# Patient Record
Sex: FEMALE | Race: WHITE | NOT HISPANIC OR LATINO | Employment: FULL TIME | URBAN - METROPOLITAN AREA
[De-identification: names, ages, dates, MRNs, and addresses within clinical notes are randomized per-mention and may not be internally consistent; named-entity substitution may affect disease eponyms.]

---

## 2018-10-18 ENCOUNTER — TELEPHONE (OUTPATIENT)
Dept: ENDOSCOPY | Facility: HOSPITAL | Age: 53
End: 2018-10-18

## 2018-11-12 ENCOUNTER — OFFICE VISIT (OUTPATIENT)
Dept: GASTROENTEROLOGY | Facility: CLINIC | Age: 53
End: 2018-11-12
Payer: COMMERCIAL

## 2018-11-12 ENCOUNTER — LAB VISIT (OUTPATIENT)
Dept: LAB | Facility: HOSPITAL | Age: 53
End: 2018-11-12
Payer: COMMERCIAL

## 2018-11-12 VITALS
SYSTOLIC BLOOD PRESSURE: 126 MMHG | WEIGHT: 234.56 LBS | HEART RATE: 57 BPM | HEIGHT: 62 IN | BODY MASS INDEX: 43.16 KG/M2 | DIASTOLIC BLOOD PRESSURE: 68 MMHG

## 2018-11-12 DIAGNOSIS — R63.4 UNINTENTIONAL WEIGHT LOSS: ICD-10-CM

## 2018-11-12 DIAGNOSIS — R10.9 ABDOMINAL PAIN, UNSPECIFIED ABDOMINAL LOCATION: ICD-10-CM

## 2018-11-12 DIAGNOSIS — R10.9 ABDOMINAL PAIN, UNSPECIFIED ABDOMINAL LOCATION: Primary | ICD-10-CM

## 2018-11-12 LAB
ALBUMIN SERPL BCP-MCNC: 4.2 G/DL
ALP SERPL-CCNC: 142 U/L
ALT SERPL W/O P-5'-P-CCNC: 21 U/L
ANION GAP SERPL CALC-SCNC: 10 MMOL/L
AST SERPL-CCNC: 23 U/L
BASOPHILS # BLD AUTO: 0.03 K/UL
BASOPHILS NFR BLD: 0.5 %
BILIRUB SERPL-MCNC: 0.5 MG/DL
BUN SERPL-MCNC: 10 MG/DL
CALCIUM SERPL-MCNC: 9.8 MG/DL
CHLORIDE SERPL-SCNC: 101 MMOL/L
CO2 SERPL-SCNC: 27 MMOL/L
CREAT SERPL-MCNC: 1 MG/DL
DIFFERENTIAL METHOD: NORMAL
EOSINOPHIL # BLD AUTO: 0.2 K/UL
EOSINOPHIL NFR BLD: 3.4 %
ERYTHROCYTE [DISTWIDTH] IN BLOOD BY AUTOMATED COUNT: 13.3 %
EST. GFR  (AFRICAN AMERICAN): >60 ML/MIN/1.73 M^2
EST. GFR  (NON AFRICAN AMERICAN): >60 ML/MIN/1.73 M^2
GLUCOSE SERPL-MCNC: 276 MG/DL
HCT VFR BLD AUTO: 44.3 %
HGB BLD-MCNC: 14.4 G/DL
IMM GRANULOCYTES # BLD AUTO: 0.02 K/UL
IMM GRANULOCYTES NFR BLD AUTO: 0.3 %
LIPASE SERPL-CCNC: 27 U/L
LYMPHOCYTES # BLD AUTO: 1.6 K/UL
LYMPHOCYTES NFR BLD: 26 %
MCH RBC QN AUTO: 28.5 PG
MCHC RBC AUTO-ENTMCNC: 32.5 G/DL
MCV RBC AUTO: 88 FL
MONOCYTES # BLD AUTO: 0.3 K/UL
MONOCYTES NFR BLD: 5.2 %
NEUTROPHILS # BLD AUTO: 3.9 K/UL
NEUTROPHILS NFR BLD: 64.6 %
NRBC BLD-RTO: 0 /100 WBC
PLATELET # BLD AUTO: 180 K/UL
PMV BLD AUTO: 10.4 FL
POTASSIUM SERPL-SCNC: 3.9 MMOL/L
PROT SERPL-MCNC: 7.6 G/DL
RBC # BLD AUTO: 5.05 M/UL
SODIUM SERPL-SCNC: 138 MMOL/L
WBC # BLD AUTO: 5.97 K/UL

## 2018-11-12 PROCEDURE — 99999 PR PBB SHADOW E&M-EST. PATIENT-LVL IV: CPT | Mod: PBBFAC,,, | Performed by: STUDENT IN AN ORGANIZED HEALTH CARE EDUCATION/TRAINING PROGRAM

## 2018-11-12 PROCEDURE — 99204 OFFICE O/P NEW MOD 45 MIN: CPT | Mod: S$GLB,,, | Performed by: STUDENT IN AN ORGANIZED HEALTH CARE EDUCATION/TRAINING PROGRAM

## 2018-11-12 PROCEDURE — 85025 COMPLETE CBC W/AUTO DIFF WBC: CPT

## 2018-11-12 PROCEDURE — 83516 IMMUNOASSAY NONANTIBODY: CPT | Mod: 59

## 2018-11-12 PROCEDURE — 36415 COLL VENOUS BLD VENIPUNCTURE: CPT

## 2018-11-12 PROCEDURE — 3008F BODY MASS INDEX DOCD: CPT | Mod: CPTII,S$GLB,, | Performed by: STUDENT IN AN ORGANIZED HEALTH CARE EDUCATION/TRAINING PROGRAM

## 2018-11-12 PROCEDURE — 80053 COMPREHEN METABOLIC PANEL: CPT

## 2018-11-12 PROCEDURE — 83690 ASSAY OF LIPASE: CPT

## 2018-11-12 RX ORDER — TRAZODONE HYDROCHLORIDE 100 MG/1
200 TABLET ORAL NIGHTLY
Refills: 5 | COMMUNITY
Start: 2018-10-22

## 2018-11-12 RX ORDER — BACLOFEN 10 MG/1
5 TABLET ORAL 2 TIMES DAILY
Refills: 5 | COMMUNITY
Start: 2018-10-29

## 2018-11-12 RX ORDER — EMPAGLIFLOZIN AND LINAGLIPTIN 25; 5 MG/1; MG/1
1 TABLET, FILM COATED ORAL DAILY
Refills: 1 | COMMUNITY
Start: 2018-08-13

## 2018-11-12 RX ORDER — BUSPIRONE HYDROCHLORIDE 5 MG/1
5 TABLET ORAL 2 TIMES DAILY
Refills: 5 | COMMUNITY
Start: 2018-10-23

## 2018-11-12 RX ORDER — INSULIN ASPART 100 [IU]/ML
INJECTION, SOLUTION INTRAVENOUS; SUBCUTANEOUS
COMMUNITY

## 2018-11-12 RX ORDER — ESZOPICLONE 3 MG/1
3 TABLET, FILM COATED ORAL NIGHTLY
Refills: 5 | COMMUNITY
Start: 2018-10-31

## 2018-11-12 RX ORDER — INSULIN DEGLUDEC 100 U/ML
INJECTION, SOLUTION SUBCUTANEOUS
COMMUNITY

## 2018-11-12 RX ORDER — HYDROCODONE BITARTRATE AND ACETAMINOPHEN 10; 325 MG/1; MG/1
1 TABLET ORAL 3 TIMES DAILY PRN
Refills: 0 | COMMUNITY
Start: 2018-10-18

## 2018-11-12 RX ORDER — GABAPENTIN 300 MG/1
300 CAPSULE ORAL 2 TIMES DAILY
Refills: 5 | COMMUNITY
Start: 2018-11-05

## 2018-11-12 RX ORDER — ROSUVASTATIN CALCIUM 20 MG/1
TABLET, COATED ORAL
Refills: 11 | COMMUNITY
Start: 2018-10-08

## 2018-11-12 RX ORDER — HYDROXYZINE PAMOATE 50 MG/1
CAPSULE ORAL
Refills: 2 | COMMUNITY
Start: 2018-10-22

## 2018-11-12 RX ORDER — BUTORPHANOL TARTRATE 10 MG/ML
SPRAY NASAL
Refills: 2 | COMMUNITY
Start: 2018-11-07

## 2018-11-12 RX ORDER — CHLORDIAZEPOXIDE HYDROCHLORIDE AND CLIDINIUM BROMIDE 5; 2.5 MG/1; MG/1
1 CAPSULE ORAL 3 TIMES DAILY
COMMUNITY

## 2018-11-12 NOTE — PROGRESS NOTES
"   [facility]    Reason for visit: The primary encounter diagnosis was Abdominal pain, unspecified abdominal location. A diagnosis of Unintentional weight loss was also pertinent to this visit.  Referring Provider/PCP: To Obtain Unable    History of Present Illness:  Geovanna Kruse is a 53 y.o. female with a history of DM, chronic pain, history of CCK and appendectomy who is presenting for initial evaluation of epigastric abdominal pain.    The patient states that she was doing well with no abdominal complaints until mid June. She states that around that time she had watery diarrhea for 2 weeks that was not concerning for her and thought was possibly due to viral infection. On 7/3 she woke up with severe epigastric abdominal pain that radiated to the back with nausea and vomiting. She went to the hospital in [place]. A CT abdomen was obtained (report available in Media section), and there were no signs of pancreatitis, and no biliary dilation or choledocholithiasis. Her lipase was elevated, although we do not have labs from then. She was admitted for 4 days for management of pancreatitis, and discharged on 7/7. She states that since then, although her diarrhea resolved completely, she is still having epigastric pain radiating to her back along with nausea and decreased appetite. Her symptoms were severe enough that she was missing ~3-4 days of work every week. She denies RUQ pain. Denies change in bowel habits, melena, or hematemesis. She lost ~25lbs since then (today 235lbs). She denies fever or chills. Denies chest pain or shortness of breath.     The patient reports being seen by a local gastroenterologist. She reports she had a repeat CT scan on 10/4, but was told that it was normal (not available in records). She also had an EGD, that she was told showed "blisters" in the stomach. She was treated for H.pylori, but eradication was not confirmed. She states that she had a swallow " study (unclear if swallow study or gastric emptying study) and was told was normal as well. She was give baclofen and chlordiazepoxide by her GI doctor that she has been taking since mid September, and referral was made to us.    Of note the patient states that although her pain is persistent, her symptoms did improve and became tolerable over the past 2 weeks. She reports her appetite is still low though. She can finish half her meals before getting nausea and recurrence of abdominal pain.     The patient denies history of abdominal pain similar to this in the past. Denies history of pancreatitis. States that she had her GB removed many years ago for biliary pain, but never had choledocholithiasis. She drinks alcohol socially, but her last drink was >2 weeks prior to episode in July. She does not smoke. States that her brother was diagnosed with metastatic pancreatic cancer at the age of 54, but he was a heavy smoker.     Review of Systems:   Constitutional: no fever, chills. +change in weight   Eyes: no visual changes   ENT: no sore throat or dysphagia  Respiratory: no cough or shortness of breath   Cardiovascular: no chest pain or palpitations   Gastrointestinal: as per HPI  Hematologic/Lymphatic: no easy bruising or lymphadenopathy   Musculoskeletal: no arthralgias or myalgias   Neurological: no change in mental status  Behavioral/Psych: no change in mood    Medical History:  Past Medical History:   Diagnosis Date    Cancer     Diabetes mellitus     Ovarian cancer        Past Surgical History:   Procedure Laterality Date    APPENDECTOMY      CHOLECYSTECTOMY      HYSTERECTOMY      TONSILLECTOMY         History reviewed. No pertinent family history.    Social History     Socioeconomic History    Marital status:      Spouse name: Not on file    Number of children: Not on file    Years of education: Not on file    Highest education level: Not on file   Social Needs    Financial resource strain:  Not on file    Food insecurity - worry: Not on file    Food insecurity - inability: Not on file    Transportation needs - medical: Not on file    Transportation needs - non-medical: Not on file   Occupational History    Not on file   Tobacco Use    Smoking status: Never Smoker    Smokeless tobacco: Never Used   Substance and Sexual Activity    Alcohol use: Not on file    Drug use: Not on file    Sexual activity: Not on file   Other Topics Concern    Not on file   Social History Narrative    Not on file       Current Outpatient Medications on File Prior to Visit   Medication Sig Dispense Refill    baclofen (LIORESAL) 10 MG tablet Take 5 mg by mouth 2 (two) times daily.  5    busPIRone (BUSPAR) 5 MG Tab Take 5 mg by mouth 2 (two) times daily.  5    butorphanol (STADOL) 10 mg/mL nasal spray ADMINISTER 1 SPRAY TO 1 NOSTRIL EVERY 4 HOURS AS NEEDED FOR HEADACHE.*MUST LAST 2 WEEKS  2    chlordiazepoxide-clidinium 5-2.5 mg (LIBRAX) 5-2.5 mg Cap Take 1 capsule by mouth 3 (three) times daily.      eszopiclone (LUNESTA) 3 mg Tab Take 3 mg by mouth every evening.  5    gabapentin (NEURONTIN) 300 MG capsule Take 300 mg by mouth 2 (two) times daily.  5    GLYXAMBI 25-5 mg Tab Take 1 tablet by mouth once daily.  1    HYDROcodone-acetaminophen (NORCO)  mg per tablet Take 1 tablet by mouth 3 (three) times daily as needed.  0    hydrOXYzine pamoate (VISTARIL) 50 MG Cap TAKE 1 TABLET BY MOUTH EVERY DAY AS NEEDED ANXIETY  2    insulin aspart U-100 (NOVOLOG) 100 unit/mL injection Inject into the skin 3 (three) times daily before meals.      insulin degludec (TRESIBA FLEXTOUCH U-100) 100 unit/mL (3 mL) InPn Inject into the skin.      rosuvastatin (CRESTOR) 20 MG tablet TAKE 1 TABLET ORALLY ONCE IN THE EVENING.  11    traZODone (DESYREL) 100 MG tablet Take 200 mg by mouth nightly.  5     No current facility-administered medications on file prior to visit.        Review of patient's allergies indicates:    Allergen Reactions    Influenza virus vaccines Anaphylaxis    Compazine [prochlorperazine edisylate] Hallucinations       Physical Exam:  General: Alert and Oriented x3, no distress   Vitals:    11/12/18 1602   BP: 126/68   Pulse: (!) 57     HEENT: Normocephalic, Atraumatic. No scleral icterus.  Lymph: No cervical lymphadenopathy  Resp: Good air entry bilaterally, no adventitious sounds.  Cardiac: S1 and S2 normal.  Abdomen: Normoactive bowel sounds. Non-distended. Normal tympany. Soft. Tender to palpation epigasric tenderness.. No peritoneal signs.  Extremities: No peripheral edema. Normal bilateral pedal and radial pulses.  Neurologic: No gross neurological Deficits  Psych: Calm, cooperative. Normal mood and affect.    Laboratory:  Lab Results   Component Value Date     04/19/2011    K 3.8 04/19/2011     04/19/2011    CO2 24 04/19/2011    BUN 12 04/19/2011    CREATININE 0.8 04/19/2011    CALCIUM 9.6 04/19/2011    ANIONGAP 14 04/19/2011    ESTGFRAFRICA >60 04/19/2011    EGFRNONAA >60 04/19/2011       Lab Results   Component Value Date    ALT 42 04/19/2011    AST 39 04/19/2011     (H) 04/19/2011    ALKPHOS 162 (H) 04/19/2011    BILITOT 0.3 04/19/2011       Lab Results   Component Value Date    WBC 7.13 04/19/2011    HGB 13.1 04/19/2011    HCT 40.1 04/19/2011    MCV 87.2 04/19/2011     04/19/2011       Microbiology:  No Pertinent Microbiology    Imaging:  CT abdomen from 7/3 with no pancreatitis or biliary dilation noted.    Assessment:  Geovanna Kruse is a 53 y.o. female who is presenting for initial evaluation of abdominal pain.    Unclear cause of pain at this time. Unfortunately many of the studies done in [place] are not available to us, and the patient will bring to next appt. Although description of symptoms might be consistent with complication of pancreatitis, a negative CT scan with contrast ~1 month ago would rule that out. She is a diabetic on insulin, and can have  motility issues, unclear if she had a swallow study vs gastric emptying. Possible vascular causes of abdominal pain, rachana that her pain is worst post-prandial, but unlikely to present itself suddenly as patient is describing. We will send the patient for imaging to evaluate the pancreas and possible etiologies for pain. Will refer for an EUS as well, as she has family history of pancreatic CA, and it is unclear what caused her initial episode. Will obtain basic labs as well. We will see back in clinic in 4 weeks with prior records.    Plan:  1. CBC, CMP, lipase, celiac panel  2. MRI with contrast and MRCP to evaluate abdominal/biliary pathologies.  3. EGD with EUS to evaluate for H.pylori eradication and evaluate the pancreas for any pathologies.  4. Obtain record from [facility].  5. CRC Screening: The patient states that she had a colonoscopy >5 years ago and was found to have polyps. She know that she is due, but prefer to get her colonoscopy locally. We requested prior colonoscopy records as well.  6. Follow-up in about 4 weeks (around 12/10/2018).      Orders Placed This Encounter   Procedures    MRI Abdomen W WO Contrast    MRI MRCP Without Contrast    CBC auto differential    Comprehensive metabolic panel    Lipase    Celiac Disease Panel

## 2018-11-12 NOTE — Clinical Note
Netta - Please schedule with advanced endoscopy for EUS (to evaluate for pancreatic abnormalities; family history of pancreatic CA, recent pancreatitis 5 months ago with persistent pain), along with EGD (to confirm eradication of H.pylori)

## 2018-11-13 NOTE — PROGRESS NOTES
I was present with Lori Jacobsen MD the fellow during the above evaluation, including history and exam.  I discussed the case with the fellow and agree with the findings and plan as documented in the fellow's note.

## 2018-11-14 ENCOUNTER — TELEPHONE (OUTPATIENT)
Dept: ENDOSCOPY | Facility: HOSPITAL | Age: 53
End: 2018-11-14

## 2018-11-14 LAB
GLIADIN PEPTIDE IGA SER-ACNC: 3 UNITS
GLIADIN PEPTIDE IGG SER-ACNC: 2 UNITS
IGA SERPL-MCNC: 85 MG/DL
TTG IGA SER-ACNC: 3 UNITS
TTG IGG SER-ACNC: 2 UNITS

## 2018-11-15 ENCOUNTER — TELEPHONE (OUTPATIENT)
Dept: GASTROENTEROLOGY | Facility: CLINIC | Age: 53
End: 2018-11-15

## 2018-11-15 NOTE — TELEPHONE ENCOUNTER
----- Message from Lori Jacobsen MD sent at 11/15/2018  5:05 PM CST -----  Contact: Self   Insurance declined MRI. Please inform patient we will contact her with the next step since her MRI /MRCP were declined  ----- Message -----  From: Kinga Aiken MA  Sent: 11/15/2018   3:48 PM  To: Lori Jacobsen MD        ----- Message -----  From: Eloisa Browne  Sent: 11/15/2018   3:24 PM  To: [facility] Staff    Pt is requesting a call back in regards to her upcoming MRI. Pt stated she was told the hospital is waiting on a call from the doctors office. Pt would like to know if she should have her MRI tomorrow.       Pt can be contacted at [phone]

## 2018-11-15 NOTE — TELEPHONE ENCOUNTER
----- Message from Eloisa Browne sent at 11/15/2018  3:24 PM CST -----  Contact: Self   Pt is requesting a call back in regards to her upcoming MRI. Pt stated she was told the hospital is waiting on a call from the doctors office. Pt would like to know if she should have her MRI tomorrow.       Pt can be contacted at [phone]

## 2018-11-16 ENCOUNTER — TELEPHONE (OUTPATIENT)
Dept: GASTROENTEROLOGY | Facility: CLINIC | Age: 53
End: 2018-11-16

## 2018-11-16 DIAGNOSIS — R10.9 ABDOMINAL PAIN, UNSPECIFIED ABDOMINAL LOCATION: Primary | ICD-10-CM

## 2018-11-16 NOTE — TELEPHONE ENCOUNTER
Your Upper EGD/EUS is scheduled for 11/28/2018       At [facility] which is located at [address] [place] [id number].  You will check in at the Hospital Admit Desk located on the 1st floor of the hospital. Please call the the office if you have any questions at [phone]      Upper Endoscopic Ultrasound    Endoscopic ultrasound(EUS) is a procedure used to image the digestive tract, including pancreas, lesions in esophagus and stomach.  It is used to diagnose and stage cancers of the digestive tract.  If necessary, your doctor may need to take samples during the procedure.    A responsible adult (family member or friend) must come with you and transport you home.  You are not allowed to drive, take a taxi or bus or leave the Endoscopy Center alone.  If you do not have a responsible adult with you to take you home, your exam will be cancelled.     If you have questions about the cost of your procedure, you should contact your insurance company as soon as possible.  Please bring a picture ID and your insurance card.  You will sign  treatment authorization forms at check in.  It is necessary for you to sign these forms again even if you recently signed these at the time of your clinic visit.    Please follow instructions of  if you are taking anticoagulant/blood thinning medications such as Aggrenox, aspirin, Brilinta, Effient, Eliquis, Lovenox, Plavix, Pletal, Pradaxa, Ticilid, Xarelto or Coumadin.     Take blood pressure, heart, anti-rejection and or seizure medication at 600 am the morning of the procedure.    Skip your morning dose of insulin or other oral medications for diabetes the morning of the procedure unless instructed otherwise by your doctor.      Day Before The Procedure    Eat a light evening meal and eat no solid food after 7 pm.  You may drink clear liquids including:    Water, Coffee or decaffeinated coffee (no milk or cream)  Tea, Herbal tea  Carbonated beverages (soft  drinks), regular and sugar free  Gelatin  Apple Juice, grape juice, white cranberry juice  Gatorade, Power Aid, Crystal Light, Go Aid  Lemonade and Limeade  Bouillon, clear consomme'  Snowball, popsicles  DO NOT DRINK ANY LIQUIDS CONTAINING RED DYE  DO NOT DRINK ANY LIQUIDS NOT SPECIFICALLY LISTED  DO NOT DRINK ALCOHOL  NOTHING BY MOUTH AFTER MIDNIGHT    Day of Procedure    600 am take last dose of any medications you are allowed to take with a small amount of clear liquid

## 2018-11-16 NOTE — TELEPHONE ENCOUNTER
----- Message from Lori Jacobsen MD sent at 11/16/2018 11:09 AM CST -----  Please schedule CT scan of the abdomen with IV contrast, pancreatic protocol.

## 2018-11-16 NOTE — PROGRESS NOTES
Contacted patient's insurance.    MRI and MRCP declined.  Requested a CT abdomen with contrast prior to authorizing MRI. Also MRCP declined as it is only approved for recurrent attacks of pancreatitis only.    We will obtain CT abdomen with IV contrast to evaluate for pancreatic complications.

## 2018-11-26 ENCOUNTER — TELEPHONE (OUTPATIENT)
Dept: ENDOSCOPY | Facility: HOSPITAL | Age: 53
End: 2018-11-26

## 2018-11-26 NOTE — TELEPHONE ENCOUNTER
Spoke with patient about __800___ arrival time. Clear liquids past 7pm the day before the procedure. NPO past midnight. Please take blood pressure, heart seizure medication the morning of the procedure. Hold all diabetic medication the morning of the procedure. Leave all valuable at home.  Please have a ride available the day of the procedure.

## 2018-11-28 ENCOUNTER — ANESTHESIA EVENT (OUTPATIENT)
Dept: ENDOSCOPY | Facility: HOSPITAL | Age: 53
End: 2018-11-28
Payer: COMMERCIAL

## 2018-11-28 ENCOUNTER — ANESTHESIA (OUTPATIENT)
Dept: ENDOSCOPY | Facility: HOSPITAL | Age: 53
End: 2018-11-28
Payer: COMMERCIAL

## 2018-11-28 ENCOUNTER — HOSPITAL ENCOUNTER (OUTPATIENT)
Facility: HOSPITAL | Age: 53
Discharge: HOME | End: 2018-11-28
Attending: INTERNAL MEDICINE | Admitting: INTERNAL MEDICINE
Payer: COMMERCIAL

## 2018-11-28 VITALS
RESPIRATION RATE: 14 BRPM | TEMPERATURE: 98 F | DIASTOLIC BLOOD PRESSURE: 68 MMHG | SYSTOLIC BLOOD PRESSURE: 128 MMHG | HEART RATE: 52 BPM | WEIGHT: 230 LBS | OXYGEN SATURATION: 97 % | BODY MASS INDEX: 40.75 KG/M2 | HEIGHT: 63 IN

## 2018-11-28 DIAGNOSIS — R63.4 WEIGHT LOSS: Primary | ICD-10-CM

## 2018-11-28 LAB — GLUCOSE SERPL-MCNC: 147 MG/DL (ref 70–110)

## 2018-11-28 PROCEDURE — 43259 EGD US EXAM DUODENUM/JEJUNUM: CPT | Mod: ,,, | Performed by: INTERNAL MEDICINE

## 2018-11-28 PROCEDURE — 27201012 HC FORCEPS, HOT/COLD, DISP: Performed by: INTERNAL MEDICINE

## 2018-11-28 PROCEDURE — 88305 TISSUE EXAM BY PATHOLOGIST: CPT | Performed by: PATHOLOGY

## 2018-11-28 PROCEDURE — 37000009 HC ANESTHESIA EA ADD 15 MINS: Performed by: INTERNAL MEDICINE

## 2018-11-28 PROCEDURE — 82962 GLUCOSE BLOOD TEST: CPT | Performed by: INTERNAL MEDICINE

## 2018-11-28 PROCEDURE — 43239 EGD BIOPSY SINGLE/MULTIPLE: CPT | Mod: 59,,, | Performed by: INTERNAL MEDICINE

## 2018-11-28 PROCEDURE — 63600175 PHARM REV CODE 636 W HCPCS: Performed by: NURSE ANESTHETIST, CERTIFIED REGISTERED

## 2018-11-28 PROCEDURE — 37000008 HC ANESTHESIA 1ST 15 MINUTES: Performed by: INTERNAL MEDICINE

## 2018-11-28 PROCEDURE — 25000003 PHARM REV CODE 250: Performed by: INTERNAL MEDICINE

## 2018-11-28 PROCEDURE — 88342 IMHCHEM/IMCYTCHM 1ST ANTB: CPT | Performed by: PATHOLOGY

## 2018-11-28 PROCEDURE — 88342 IMHCHEM/IMCYTCHM 1ST ANTB: CPT | Mod: 26,,, | Performed by: PATHOLOGY

## 2018-11-28 PROCEDURE — 43259 EGD US EXAM DUODENUM/JEJUNUM: CPT | Performed by: INTERNAL MEDICINE

## 2018-11-28 PROCEDURE — 88305 TISSUE EXAM BY PATHOLOGIST: CPT | Mod: 26,,, | Performed by: PATHOLOGY

## 2018-11-28 PROCEDURE — 43239 EGD BIOPSY SINGLE/MULTIPLE: CPT | Performed by: INTERNAL MEDICINE

## 2018-11-28 RX ORDER — SODIUM CHLORIDE 0.9 % (FLUSH) 0.9 %
3 SYRINGE (ML) INJECTION
Status: DISCONTINUED | OUTPATIENT
Start: 2018-11-28 | End: 2018-11-28 | Stop reason: HOSPADM

## 2018-11-28 RX ORDER — LIDOCAINE HCL/PF 100 MG/5ML
SYRINGE (ML) INTRAVENOUS
Status: DISCONTINUED | OUTPATIENT
Start: 2018-11-28 | End: 2018-11-28

## 2018-11-28 RX ORDER — PROPOFOL 10 MG/ML
VIAL (ML) INTRAVENOUS CONTINUOUS PRN
Status: DISCONTINUED | OUTPATIENT
Start: 2018-11-28 | End: 2018-11-28

## 2018-11-28 RX ORDER — PROPOFOL 10 MG/ML
VIAL (ML) INTRAVENOUS
Status: DISCONTINUED | OUTPATIENT
Start: 2018-11-28 | End: 2018-11-28

## 2018-11-28 RX ORDER — MIDAZOLAM HYDROCHLORIDE 1 MG/ML
INJECTION, SOLUTION INTRAMUSCULAR; INTRAVENOUS
Status: DISCONTINUED | OUTPATIENT
Start: 2018-11-28 | End: 2018-11-28

## 2018-11-28 RX ORDER — FENTANYL CITRATE 50 UG/ML
INJECTION, SOLUTION INTRAMUSCULAR; INTRAVENOUS
Status: DISCONTINUED | OUTPATIENT
Start: 2018-11-28 | End: 2018-11-28

## 2018-11-28 RX ORDER — SODIUM CHLORIDE 9 MG/ML
INJECTION, SOLUTION INTRAVENOUS CONTINUOUS
Status: DISCONTINUED | OUTPATIENT
Start: 2018-11-28 | End: 2018-11-28 | Stop reason: HOSPADM

## 2018-11-28 RX ADMIN — MIDAZOLAM 2 MG: 1 INJECTION INTRAMUSCULAR; INTRAVENOUS at 09:11

## 2018-11-28 RX ADMIN — PROPOFOL 50 MG: 10 INJECTION, EMULSION INTRAVENOUS at 09:11

## 2018-11-28 RX ADMIN — PROPOFOL 150 MCG/KG/MIN: 10 INJECTION, EMULSION INTRAVENOUS at 09:11

## 2018-11-28 RX ADMIN — FENTANYL CITRATE 50 MCG: 50 INJECTION, SOLUTION INTRAMUSCULAR; INTRAVENOUS at 09:11

## 2018-11-28 RX ADMIN — LIDOCAINE HYDROCHLORIDE 100 MG: 20 INJECTION, SOLUTION INTRAVENOUS at 09:11

## 2018-11-28 RX ADMIN — SODIUM CHLORIDE: 0.9 INJECTION, SOLUTION INTRAVENOUS at 08:11

## 2018-11-28 NOTE — ANESTHESIA PREPROCEDURE EVALUATION
11/28/2018  Geovanna Kruse is a 53 y.o., female having upper EUS for eval GI symptoms.    Anesthesia Evaluation    I have reviewed the Patient Summary Reports.    I have reviewed the Nursing Notes.   I have reviewed the Medications.     Review of Systems  Anesthesia Hx:  No problems with previous Anesthesia  Personal Hx of Anesthesia complications   Social:  Non-Smoker    Neurological:   Headaches    Endocrine:   Diabetes        Physical Exam  General:  Obesity    Airway/Jaw/Neck:  Airway Findings: Mouth Opening: Normal Tongue: Normal  General Airway Assessment: Adult  Mallampati: III  Improves to III with phonation.  TM Distance: Normal, at least 6 cm  Jaw/Neck Findings:  Neck ROM: Normal ROM       Chest/Lungs:  Chest/Lungs Findings: Clear to auscultation, Normal Respiratory Rate     Heart/Vascular:  Heart Findings: Rate: Normal  Rhythm: Regular Rhythm  Sounds: Normal        Mental Status:  Mental Status Findings:  Alert and Oriented         Medical History:       Past Medical History:   Diagnosis Date    Cancer      Diabetes mellitus      Ovarian cancer                 Past Surgical History:   Procedure Laterality Date    APPENDECTOMY        CHOLECYSTECTOMY        HYSTERECTOMY        TONSILLECTOMY           Anesthesia Plan  Type of Anesthesia, risks & benefits discussed:  Anesthesia Type:  MAC  Patient's Preference:   Intra-op Monitoring Plan:   Intra-op Monitoring Plan Comments:   Post Op Pain Control Plan:   Post Op Pain Control Plan Comments:   Induction:   IV  Beta Blocker:  Patient is not currently on a Beta-Blocker (No further documentation required).       Informed Consent: Patient understands risks and agrees with Anesthesia plan.  Questions answered. Anesthesia consent signed with patient.  ASA Score: 3     Day of Surgery Review of History & Physical: I have interviewed and examined  the patient. I have reviewed the patient's H&P dated:            Ready For Surgery From Anesthesia Perspective.

## 2018-11-28 NOTE — TRANSFER OF CARE
"Anesthesia Transfer of Care Note    Patient: Geovanna Kruse    Procedure(s) Performed: Procedure(s) (LRB):  EGD (ESOPHAGOGASTRODUODENOSCOPY) (N/A)  ULTRASOUND, UPPER GI TRACT, ENDOSCOPIC (N/A)    Patient location: GI    Anesthesia Type: MAC    Transport from OR: Transported from OR on room air with adequate spontaneous ventilation    Post pain: adequate analgesia    Post assessment: no apparent anesthetic complications    Post vital signs: stable    Level of consciousness: awake    Nausea/Vomiting: no nausea/vomiting    Complications: none    Transfer of care protocol was followed      Last vitals:   Visit Vitals  BP (!) 122/58 (BP Location: Left arm, Patient Position: Lying)   Pulse (!) 55   Temp 37.1 °C (98.8 °F) (Tympanic)   Resp 17   Ht 5' 2.5" (1.588 m)   Wt 104.3 kg (230 lb)   SpO2 99%   Breastfeeding? No   BMI 41.40 kg/m²     "

## 2018-11-28 NOTE — [employer] PATIENT INSTRUCTIONS
Discharge Summary/Instructions after an Endoscopic Procedure  Patient Name: Geovanna Kruse  Patient MRN: [id number]  Patient YOB: 1965 Wednesday, November 28, 2018  Alfredo Horner MD  RESTRICTIONS:  During your procedure today, you received medications for sedation.  These   medications may affect your judgment, balance and coordination.  Therefore,   for 24 hours, you have the following restrictions:   - DO NOT drive a car, operate machinery, make legal/financial decisions,   sign important papers or drink alcohol.    ACTIVITY:  Today: no heavy lifting, straining or running due to procedural   sedation/anesthesia.  The following day: return to full activity including work.  DIET:  Eat and drink normally unless instructed otherwise.     TREATMENT FOR COMMON SIDE EFFECTS:  - Mild abdominal pain, nausea, belching, bloating or excessive gas:  rest,   eat lightly and use a heating pad.  - Sore Throat: treat with throat lozenges and/or gargle with warm salt   water.  - Because air was used during the procedure, expelling large amounts of air   from your rectum or belching is normal.  - If a bowel prep was taken, you may not have a bowel movement for 1-3 days.    This is normal.  SYMPTOMS TO WATCH FOR AND REPORT TO YOUR PHYSICIAN:  1. Abdominal pain or bloating, other than gas cramps.  2. Chest pain.  3. Back pain.  4. Signs of infection such as: chills or fever occurring within 24 hours   after the procedure.  5. Rectal bleeding, which would show as bright red, maroon, or black stools.   (A tablespoon of blood from the rectum is not serious, especially if   hemorrhoids are present.)  6. Vomiting.  7. Weakness or dizziness.  GO DIRECTLY TO THE NEAREST EMERGENCY ROOM IF YOU HAVE ANY OF THE FOLLOWING:      Difficulty breathing              Chills and/or fever over 101 F   Persistent vomiting and/or vomiting blood   Severe abdominal pain   Severe chest pain   Black, tarry stools   Bleeding- more than one  tablespoon   Any other symptom or condition that you feel may need urgent attention  Your doctor recommends these additional instructions:  If any biopsies were taken, your doctors clinic will contact you in 1 to 2   weeks with any results.  - Discharge patient to home (ambulatory).   - Patient has a contact number available for emergencies.  The signs and   symptoms of potential delayed complications were discussed with the   patient.  Return to normal activities tomorrow.  Written discharge   instructions were provided to the patient.   - Resume previous diet.   - Continue present medications.   - Await path results.   - Return to GI clinic as previously scheduled.  For questions, problems or results please call your physician - Alfredo Horner MD at Work:  [phone].  EMERGENCY PHONE NUMBER: [phone],  LAB RESULTS: [phone]  IF A COMPLICATION OR EMERGENCY SITUATION ARISES AND YOU ARE UNABLE TO REACH   YOUR PHYSICIAN - GO DIRECTLY TO THE EMERGENCY ROOM.  Alfredo Horner MD  11/28/2018 9:50:09 AM  This report has been verified and signed electronically.  [employer]

## 2018-11-28 NOTE — ANESTHESIA POSTPROCEDURE EVALUATION
"Anesthesia Post Evaluation    Patient: Geovanna Kruse    Procedure(s) Performed: Procedure(s) (LRB):  EGD (ESOPHAGOGASTRODUODENOSCOPY) (N/A)  ULTRASOUND, UPPER GI TRACT, ENDOSCOPIC (N/A)    Final Anesthesia Type: MAC  Patient location during evaluation: GI PACU  Patient participation: Yes- Able to Participate  Level of consciousness: awake and alert and oriented  Post-procedure vital signs: reviewed and stable  Pain management: adequate  Airway patency: patent  PONV status at discharge: No PONV  Anesthetic complications: no      Cardiovascular status: blood pressure returned to baseline and hemodynamically stable  Respiratory status: unassisted, spontaneous ventilation and room air  Hydration status: euvolemic  Follow-up not needed.        Visit Vitals  BP (!) 122/58 (BP Location: Left arm, Patient Position: Lying)   Pulse (!) 55   Temp 37.1 °C (98.8 °F) (Tympanic)   Resp 17   Ht 5' 2.5" (1.588 m)   Wt 104.3 kg (230 lb)   SpO2 99%   Breastfeeding? No   BMI 41.40 kg/m²       Pain/Lani Score: No Data Recorded      "

## 2018-12-05 ENCOUNTER — HOSPITAL ENCOUNTER (OUTPATIENT)
Dept: RADIOLOGY | Facility: HOSPITAL | Age: 53
Discharge: HOME | End: 2018-12-05
Attending: STUDENT IN AN ORGANIZED HEALTH CARE EDUCATION/TRAINING PROGRAM
Payer: COMMERCIAL

## 2018-12-05 DIAGNOSIS — R10.9 ABDOMINAL PAIN, UNSPECIFIED ABDOMINAL LOCATION: ICD-10-CM

## 2018-12-05 PROCEDURE — 25500020 PHARM REV CODE 255: Performed by: STUDENT IN AN ORGANIZED HEALTH CARE EDUCATION/TRAINING PROGRAM

## 2018-12-05 PROCEDURE — 74170 CT ABD WO CNTRST FLWD CNTRST: CPT | Mod: TC

## 2018-12-05 PROCEDURE — 74170 CT ABD WO CNTRST FLWD CNTRST: CPT | Mod: 26,,, | Performed by: RADIOLOGY

## 2018-12-05 RX ADMIN — IOHEXOL 100 ML: 350 INJECTION, SOLUTION INTRAVENOUS at 10:12

## 2018-12-19 ENCOUNTER — OFFICE VISIT (OUTPATIENT)
Dept: GASTROENTEROLOGY | Facility: CLINIC | Age: 53
End: 2018-12-19
Payer: COMMERCIAL

## 2018-12-19 ENCOUNTER — LAB VISIT (OUTPATIENT)
Dept: LAB | Facility: HOSPITAL | Age: 53
End: 2018-12-19
Payer: COMMERCIAL

## 2018-12-19 VITALS
SYSTOLIC BLOOD PRESSURE: 122 MMHG | HEART RATE: 48 BPM | WEIGHT: 231.25 LBS | HEIGHT: 62 IN | BODY MASS INDEX: 42.55 KG/M2 | DIASTOLIC BLOOD PRESSURE: 73 MMHG

## 2018-12-19 DIAGNOSIS — R10.9 ABDOMINAL PAIN, UNSPECIFIED ABDOMINAL LOCATION: Primary | ICD-10-CM

## 2018-12-19 DIAGNOSIS — R10.9 ABDOMINAL PAIN, UNSPECIFIED ABDOMINAL LOCATION: ICD-10-CM

## 2018-12-19 LAB
ALBUMIN SERPL BCP-MCNC: 4.1 G/DL
ALP SERPL-CCNC: 127 U/L
ALT SERPL W/O P-5'-P-CCNC: 20 U/L
ANION GAP SERPL CALC-SCNC: 12 MMOL/L
AST SERPL-CCNC: 20 U/L
BILIRUB SERPL-MCNC: 0.3 MG/DL
BUN SERPL-MCNC: 8 MG/DL
CALCIUM SERPL-MCNC: 9.8 MG/DL
CHLORIDE SERPL-SCNC: 109 MMOL/L
CO2 SERPL-SCNC: 22 MMOL/L
CREAT SERPL-MCNC: 0.9 MG/DL
EST. GFR  (AFRICAN AMERICAN): >60 ML/MIN/1.73 M^2
EST. GFR  (NON AFRICAN AMERICAN): >60 ML/MIN/1.73 M^2
GGT SERPL-CCNC: 31 U/L
GLUCOSE SERPL-MCNC: 194 MG/DL
LACTATE SERPL-SCNC: 1.9 MMOL/L
POTASSIUM SERPL-SCNC: 3.9 MMOL/L
PROT SERPL-MCNC: 7.4 G/DL
SODIUM SERPL-SCNC: 143 MMOL/L

## 2018-12-19 PROCEDURE — 80053 COMPREHEN METABOLIC PANEL: CPT

## 2018-12-19 PROCEDURE — 82977 ASSAY OF GGT: CPT

## 2018-12-19 PROCEDURE — 3008F BODY MASS INDEX DOCD: CPT | Mod: CPTII,S$GLB,, | Performed by: STUDENT IN AN ORGANIZED HEALTH CARE EDUCATION/TRAINING PROGRAM

## 2018-12-19 PROCEDURE — 83605 ASSAY OF LACTIC ACID: CPT

## 2018-12-19 PROCEDURE — 84080 ASSAY ALKALINE PHOSPHATASES: CPT

## 2018-12-19 PROCEDURE — 99999 PR PBB SHADOW E&M-EST. PATIENT-LVL IV: CPT | Mod: PBBFAC,,, | Performed by: STUDENT IN AN ORGANIZED HEALTH CARE EDUCATION/TRAINING PROGRAM

## 2018-12-19 PROCEDURE — 84075 ASSAY ALKALINE PHOSPHATASE: CPT

## 2018-12-19 PROCEDURE — 99213 OFFICE O/P EST LOW 20 MIN: CPT | Mod: S$GLB,,, | Performed by: STUDENT IN AN ORGANIZED HEALTH CARE EDUCATION/TRAINING PROGRAM

## 2018-12-21 NOTE — PROGRESS NOTES
"   [facility]    Reason for visit: The encounter diagnosis was Abdominal pain, unspecified abdominal location.  Referring Provider/PCP: Syd Cedillo MD    History of Present Illness:  Geovanna Kruse is a 53 y.o. female with a history of DM, chronic pain, history of CCK and appendectomy who is presenting for follow up evaluation of epigastric abdominal pain.    Prior history:   The patient states that she was doing well with no abdominal complaints until mid June. She states that around that time she had watery diarrhea for 2 weeks that was not concerning for her and thought was possibly due to viral infection. On 7/3 she woke up with severe epigastric abdominal pain that radiated to the back with nausea and vomiting. She went to the hospital in [place]. A CT abdomen was obtained (report available in Media section), and there were no signs of pancreatitis, and no biliary dilation or choledocholithiasis. Her lipase was elevated, although we do not have labs from then. She was admitted for 4 days for management of pancreatitis, and discharged on 7/7. She states that since then, although her diarrhea resolved completely, she is still having epigastric pain radiating to her back along with nausea and decreased appetite. Her symptoms were severe enough that she was missing ~3-4 days of work every week. No RUQ pain. No change in bowel habits, melena, or hematemesis. She lost ~25lbs until the date of last appt (11/12/2018; weight was 235lbs). No fever, chills, chest pain or shortness of breath were reported.      The patient was seen by a local gastroenterologist prior to our evaluation. She had a repeat CT scan on 10/4, was normal (except for fatty liver). She also had an EGD in 9/2018 with positive biopsies for H.pylori. She was treated for H.pylori, but reports no improvement of symptoms. She had a Gastric emptying study and was reported as "half gastric emptying at 75 minutes". " "She was give baclofen and chlordiazepoxide by her GI doctor that she was taking but was not helping her.    The patient denies history of abdominal pain similar to this in the past. Denies history of pancreatitis.She had her GB removed in 1995 due to cholycystitis, but never had choledocholithiasis. She used to drink alcohol socially, but her last drink was >2 weeks prior to episode in July. She does not smoke. States that her brother was diagnosed with metastatic pancreatic cancer at the age of 54, but he was a heavy smoker.      Interval history  Today, the patient states that her pain is persistent but becoming more tolerable, however, soon after she starts eating food, she starts to have severe epigastric abdominal pain, described as pressure of 2 "elephants sitting on my stomach". She can finish half her meals before getting nausea and recurrence of abdominal pain; and states that she has her good and bad days. She reports that although her appetite is normal, she is afraid of food as this might worsen the pain. She has lost an additional 4lbs since last appt. Of note, the patient takes ibuprofen for migraine headaches very occasionally, but she notes it helps with her epigastric pain.  We obtained an EUS and a CT scan of the abdomen with IV contrast since last visit. Her EUS showed signs of fatty infiltration of the pancreas, but no signs of chronic pancreatitis or masses. Biopsies taken from the stomach were negative for H.pylori. CT abdomen negative for significant abnormalities (also with fatty liver). Also obtained labs that were normal, except for a mildly elevated ALKP of 142. Lipase was normal.      PEndoHx:  EUS done 11/28 reported as follows:  Impression:           - Small hiatal hernia.                        - Erythematous mucosa in the antrum. Biopsied. (negative for H.pylori)                        - No gross lesions in the entire examined duodenum.                        - There was dilation in " the common bile duct which                         measured up to 9 mm (appropriate for post                         cholecystectomy state).                        - Pancreatic parenchymal abnormalities consisting                         of diffuse echogenicity were noted in the entire                         pancreas (consistent with fat infiltration). No                         evidence for chronic pancreatitis, focal mass, or                         PD dilation.      Review of Systems:   Constitutional: no fever, chills or change in weight   Eyes: no visual changes   ENT: no sore throat or dysphagia  Respiratory: no cough or shortness of breath   Cardiovascular: no chest pain or palpitations   Gastrointestinal: as per HPI  Hematologic/Lymphatic: no easy bruising or lymphadenopathy   Musculoskeletal: no arthralgias or myalgias   Neurological: no change in mental status  Behavioral/Psych: no change in mood    Medical History:  Past Medical History:   Diagnosis Date    Cancer     Diabetes mellitus     Migraines     Ovarian cancer        Past Surgical History:   Procedure Laterality Date    APPENDECTOMY      CHOLECYSTECTOMY      EGD (ESOPHAGOGASTRODUODENOSCOPY) N/A 11/28/2018    Performed by Alfredo Horner [facility] at [facility]    ENDOSCOPIC ULTRASOUND OF UPPER GASTROINTESTINAL TRACT N/A 11/28/2018    Procedure: ULTRASOUND, UPPER GI TRACT, ENDOSCOPIC;  Surgeon: Alfredo Horner MD;  Location: [facility];  Service: Endoscopy;  Laterality: N/A;  Please schedule with advanced endoscopy for EUS (to evaluate for pancreatic abnormalities; family history of pancreatic CA, recent pancreatitis 5 months ago with persistent pain), along with EGD (to confirm eradication of H.pylori)    ESOPHAGOGASTRODUODENOSCOPY N/A 11/28/2018    Procedure: EGD (ESOPHAGOGASTRODUODENOSCOPY);  Surgeon: Alfredo Horner MD;  Location: [facility];  Service: Endoscopy;  Laterality: N/A;    HYSTERECTOMY      TONSILLECTOMY      ULTRASOUND, UPPER GI  TRACT, ENDOSCOPIC N/A 11/28/2018    Performed by [facility] at [facility]       Family History   Problem Relation Age of Onset    Pancreatic cancer Brother     Liver cancer Brother     Lung cancer Brother        Social History     Socioeconomic History    Marital status:      Spouse name: Not on file    Number of children: Not on file    Years of education: Not on file    Highest education level: Not on file   Social Needs    Financial resource strain: Not on file    Food insecurity - worry: Not on file    Food insecurity - inability: Not on file    Transportation needs - medical: Not on file    Transportation needs - non-medical: Not on file   Occupational History    Not on file   Tobacco Use    Smoking status: Never Smoker    Smokeless tobacco: Never Used   Substance and Sexual Activity    Alcohol use: No     Frequency: Never    Drug use: Not on file    Sexual activity: Not on file   Other Topics Concern    Not on file   Social History Narrative    Not on file       Current Outpatient Medications on File Prior to Visit   Medication Sig Dispense Refill    baclofen (LIORESAL) 10 MG tablet Take 5 mg by mouth 2 (two) times daily.  5    busPIRone (BUSPAR) 5 MG Tab Take 5 mg by mouth 2 (two) times daily.  5    butorphanol (STADOL) 10 mg/mL nasal spray ADMINISTER 1 SPRAY TO 1 NOSTRIL EVERY 4 HOURS AS NEEDED FOR HEADACHE.*MUST LAST 2 WEEKS  2    chlordiazepoxide-clidinium 5-2.5 mg (LIBRAX) 5-2.5 mg Cap Take 1 capsule by mouth 3 (three) times daily.      eszopiclone (LUNESTA) 3 mg Tab Take 3 mg by mouth every evening.  5    gabapentin (NEURONTIN) 300 MG capsule Take 300 mg by mouth 2 (two) times daily.  5    GLYXAMBI 25-5 mg Tab Take 1 tablet by mouth once daily.  1    HYDROcodone-acetaminophen (NORCO)  mg per tablet Take 1 tablet by mouth 3 (three) times daily as needed.  0    hydrOXYzine pamoate (VISTARIL) 50 MG Cap TAKE 1 TABLET BY MOUTH EVERY DAY AS NEEDED ANXIETY  2     insulin aspart U-100 (NOVOLOG) 100 unit/mL injection Inject into the skin 3 (three) times daily before meals.      insulin degludec (TRESIBA FLEXTOUCH U-100) 100 unit/mL (3 mL) InPn Inject into the skin.      rosuvastatin (CRESTOR) 20 MG tablet TAKE 1 TABLET ORALLY ONCE IN THE EVENING.  11    traZODone (DESYREL) 100 MG tablet Take 200 mg by mouth nightly.  5     No current facility-administered medications on file prior to visit.        Review of patient's allergies indicates:   Allergen Reactions    Influenza virus vaccines Other (See Comments)     petechaie    Compazine [prochlorperazine edisylate] Hallucinations       Physical Exam:  General: Alert and Oriented x3, no distress   Vitals:    12/19/18 1608   BP: 122/73   Pulse: (!) 48     HEENT: Normocephalic, Atraumatic. No scleral icterus.  Lymph: No cervical lymphadenopathy  Resp: Good air entry bilaterally, no adventitious sounds.  Cardiac: S1 and S2 normal.  Abdomen: Normoactive bowel sounds. Non-distended. Normal tympany. Soft. Tender to palpation in the epigastric region, also with tenderness on the lower part of the sternum. No peritoneal signs.  Extremities: No peripheral edema. Normal bilateral pedal and radial pulses.  Neurologic: No gross neurological Deficits  Psych: Calm, cooperative. Normal mood and affect.    Laboratory:  Lab Results   Component Value Date     12/19/2018    K 3.9 12/19/2018     12/19/2018    CO2 22 (L) 12/19/2018    BUN 8 12/19/2018    CREATININE 0.9 12/19/2018    CALCIUM 9.8 12/19/2018    ANIONGAP 12 12/19/2018    ESTGFRAFRICA >60.0 12/19/2018    EGFRNONAA >60.0 12/19/2018       Lab Results   Component Value Date    ALT 20 12/19/2018    AST 20 12/19/2018    GGT 31 12/19/2018    ALKPHOS 127 12/19/2018    BILITOT 0.3 12/19/2018       Lab Results   Component Value Date    WBC 5.97 11/12/2018    HGB 14.4 11/12/2018    HCT 44.3 11/12/2018    MCV 88 11/12/2018     11/12/2018       Microbiology:  No Pertinent  Microbiology    Imaging:  CT abdomen and pelvis with IV contract   1. No CT evidence of pancreatitis or ductal dilatation.  Prior cholecystectomy.  2. Mild fatty infiltration of the liver.  No focal abnormality.  3. Tiny, stable subcentimeter hypodensities within the kidneys compatible with small cysts and 2 stable punctate nonobstructing right renal calculi.  Similar findings present on the prior study of February 15, 2011.    Assessment:  Geovanna Kruse is a 53 y.o. female who is presenting for follow-up evaluation of epigastric abdominal pain.    This remains to be of uncertain etiology. The patient reports aversion to food due to epigastric pain after eating, which can be a sign of mesenteric ischemia. However this would not explain tenderness to palpation of the epigastric are and lower sternum. Possible xiphoiditis, but would not explain post-prandial abdominal pain. She has history of elevated ALKP and GGT in the past. She has history of abdominal surgeries after cholecystitis with reported adhesions at the time of surgery; can suggest pain due to adhesions; but seems less likely. Will repeat lab evaluation with ALKP isoform, and GGT, and obtain U/S doppler mesenteric. Also asked the patient to try ibuprofen twice daily for the next 5 days and assess response to evaluate for and rule out MSK causes.      Plan:  1. CMP, ALKP isoform, GGT.  2. U/S doppler abdominal mesenteric; obtain lactic acid  3. Trial of ibuprofen for 5 days; twice daily  4. Follow-up in about 6 weeks (around 1/30/2019).      Orders Placed This Encounter   Procedures    US Mesenteric Ischemia Study (xpd)    Comprehensive metabolic panel    Gamma GT    LACTIC ACID, PLASMA    ALKALINE PHOSPHATASE, ISOENZYMES

## 2018-12-24 ENCOUNTER — HOSPITAL ENCOUNTER (OUTPATIENT)
Dept: RADIOLOGY | Facility: HOSPITAL | Age: 53
Discharge: HOME | End: 2018-12-24
Attending: STUDENT IN AN ORGANIZED HEALTH CARE EDUCATION/TRAINING PROGRAM
Payer: COMMERCIAL

## 2018-12-24 DIAGNOSIS — R10.9 ABDOMINAL PAIN, UNSPECIFIED ABDOMINAL LOCATION: ICD-10-CM

## 2018-12-24 PROCEDURE — 76775 US EXAM ABDO BACK WALL LIM: CPT | Mod: 26,XS,, | Performed by: RADIOLOGY

## 2018-12-24 PROCEDURE — 76775 US EXAM ABDO BACK WALL LIM: CPT | Mod: TC

## 2018-12-24 PROCEDURE — 93976 VASCULAR STUDY: CPT | Mod: 26,,, | Performed by: RADIOLOGY

## 2018-12-24 PROCEDURE — 93976 VASCULAR STUDY: CPT | Mod: TC

## 2018-12-27 LAB
ALP BONE CFR SERPL: 21 % (ref 28–66)
ALP INTEST CFR SERPL: 0 % (ref 1–24)
ALP LIVER CFR SERPL: 79 % (ref 25–69)
ALP PLAC CFR SERPL: 0 %
ALP SERPL-CCNC: 126 U/L (ref 33–130)

## 2021-02-23 ENCOUNTER — TELEPHONE (OUTPATIENT)
Dept: ENDOSCOPY | Facility: HOSPITAL | Age: 56
End: 2021-02-23

## 2021-02-24 ENCOUNTER — TELEPHONE (OUTPATIENT)
Dept: GASTROENTEROLOGY | Facility: CLINIC | Age: 56
End: 2021-02-24

## 2021-02-24 ENCOUNTER — TELEPHONE (OUTPATIENT)
Dept: ENDOSCOPY | Facility: HOSPITAL | Age: 56
End: 2021-02-24

## 2021-02-25 ENCOUNTER — TELEPHONE (OUTPATIENT)
Dept: GASTROENTEROLOGY | Facility: CLINIC | Age: 56
End: 2021-02-25

## 2021-03-23 ENCOUNTER — TELEPHONE (OUTPATIENT)
Dept: GASTROENTEROLOGY | Facility: CLINIC | Age: 56
End: 2021-03-23

## 2021-03-24 ENCOUNTER — OFFICE VISIT (OUTPATIENT)
Dept: GASTROENTEROLOGY | Facility: CLINIC | Age: 56
End: 2021-03-24
Payer: COMMERCIAL

## 2021-03-24 ENCOUNTER — TELEPHONE (OUTPATIENT)
Dept: ENDOSCOPY | Facility: HOSPITAL | Age: 56
End: 2021-03-24

## 2021-03-24 VITALS
BODY MASS INDEX: 41.14 KG/M2 | SYSTOLIC BLOOD PRESSURE: 142 MMHG | HEIGHT: 62 IN | HEART RATE: 60 BPM | WEIGHT: 223.56 LBS | DIASTOLIC BLOOD PRESSURE: 72 MMHG

## 2021-03-24 DIAGNOSIS — Z12.11 SPECIAL SCREENING FOR MALIGNANT NEOPLASMS, COLON: Primary | ICD-10-CM

## 2021-03-24 DIAGNOSIS — R10.13 EPIGASTRIC PAIN: Primary | ICD-10-CM

## 2021-03-24 DIAGNOSIS — K59.00 CONSTIPATION, UNSPECIFIED CONSTIPATION TYPE: ICD-10-CM

## 2021-03-24 PROCEDURE — 99214 PR OFFICE/OUTPT VISIT, EST, LEVL IV, 30-39 MIN: ICD-10-PCS | Mod: S$GLB,,, | Performed by: STUDENT IN AN ORGANIZED HEALTH CARE EDUCATION/TRAINING PROGRAM

## 2021-03-24 PROCEDURE — 99999 PR PBB SHADOW E&M-EST. PATIENT-LVL IV: CPT | Mod: PBBFAC,,, | Performed by: STUDENT IN AN ORGANIZED HEALTH CARE EDUCATION/TRAINING PROGRAM

## 2021-03-24 PROCEDURE — 99999 PR PBB SHADOW E&M-EST. PATIENT-LVL IV: ICD-10-PCS | Mod: PBBFAC,,, | Performed by: STUDENT IN AN ORGANIZED HEALTH CARE EDUCATION/TRAINING PROGRAM

## 2021-03-24 PROCEDURE — 99214 OFFICE O/P EST MOD 30 MIN: CPT | Mod: S$GLB,,, | Performed by: STUDENT IN AN ORGANIZED HEALTH CARE EDUCATION/TRAINING PROGRAM

## 2021-03-24 RX ORDER — SODIUM, POTASSIUM,MAG SULFATES 17.5-3.13G
SOLUTION, RECONSTITUTED, ORAL ORAL
Qty: 1 BOTTLE | Refills: 0 | Status: SHIPPED | OUTPATIENT
Start: 2021-03-24

## 2021-04-26 ENCOUNTER — HOSPITAL ENCOUNTER (OUTPATIENT)
Dept: PREADMISSION TESTING | Facility: HOSPITAL | Age: 56
Discharge: HOME | End: 2021-04-26
Attending: FAMILY MEDICINE
Payer: COMMERCIAL

## 2021-04-26 DIAGNOSIS — Z12.11 SPECIAL SCREENING FOR MALIGNANT NEOPLASMS, COLON: ICD-10-CM

## 2021-04-26 LAB — SARS-COV-2 RDRP RESP QL NAA+PROBE: NEGATIVE

## 2021-04-26 PROCEDURE — U0002 COVID-19 LAB TEST NON-CDC: HCPCS | Performed by: FAMILY MEDICINE

## 2021-04-29 ENCOUNTER — ANESTHESIA EVENT (OUTPATIENT)
Dept: ENDOSCOPY | Facility: HOSPITAL | Age: 56
End: 2021-04-29
Payer: COMMERCIAL

## 2021-04-29 ENCOUNTER — ANESTHESIA (OUTPATIENT)
Dept: ENDOSCOPY | Facility: HOSPITAL | Age: 56
End: 2021-04-29
Payer: COMMERCIAL

## 2021-04-29 ENCOUNTER — HOSPITAL ENCOUNTER (OUTPATIENT)
Facility: HOSPITAL | Age: 56
Discharge: HOME | End: 2021-04-29
Attending: INTERNAL MEDICINE | Admitting: INTERNAL MEDICINE
Payer: COMMERCIAL

## 2021-04-29 VITALS
TEMPERATURE: 98 F | BODY MASS INDEX: 40.48 KG/M2 | HEART RATE: 47 BPM | DIASTOLIC BLOOD PRESSURE: 59 MMHG | HEIGHT: 62 IN | SYSTOLIC BLOOD PRESSURE: 121 MMHG | WEIGHT: 220 LBS | RESPIRATION RATE: 16 BRPM | OXYGEN SATURATION: 99 %

## 2021-04-29 DIAGNOSIS — Z12.11 COLON CANCER SCREENING: ICD-10-CM

## 2021-04-29 LAB
GLUCOSE SERPL-MCNC: 121 MG/DL (ref 70–110)
POCT GLUCOSE: 121 MG/DL (ref 70–110)

## 2021-04-29 PROCEDURE — E9220 PRA ENDO ANESTHESIA: HCPCS | Mod: ,,, | Performed by: NURSE ANESTHETIST, CERTIFIED REGISTERED

## 2021-04-29 PROCEDURE — G0121 COLON CA SCRN NOT HI RSK IND: ICD-10-PCS | Mod: ,,, | Performed by: INTERNAL MEDICINE

## 2021-04-29 PROCEDURE — E9220 PRA ENDO ANESTHESIA: ICD-10-PCS | Mod: ,,, | Performed by: NURSE ANESTHETIST, CERTIFIED REGISTERED

## 2021-04-29 PROCEDURE — G0121 COLON CA SCRN NOT HI RSK IND: HCPCS | Performed by: INTERNAL MEDICINE

## 2021-04-29 PROCEDURE — 25000003 PHARM REV CODE 250: Performed by: NURSE ANESTHETIST, CERTIFIED REGISTERED

## 2021-04-29 PROCEDURE — G0121 COLON CA SCRN NOT HI RSK IND: HCPCS | Mod: ,,, | Performed by: INTERNAL MEDICINE

## 2021-04-29 PROCEDURE — 37000009 HC ANESTHESIA EA ADD 15 MINS: Performed by: INTERNAL MEDICINE

## 2021-04-29 PROCEDURE — 25000003 PHARM REV CODE 250: Performed by: INTERNAL MEDICINE

## 2021-04-29 PROCEDURE — 37000008 HC ANESTHESIA 1ST 15 MINUTES: Performed by: INTERNAL MEDICINE

## 2021-04-29 PROCEDURE — 63600175 PHARM REV CODE 636 W HCPCS: Performed by: NURSE ANESTHETIST, CERTIFIED REGISTERED

## 2021-04-29 RX ORDER — SODIUM CHLORIDE 9 MG/ML
INJECTION, SOLUTION INTRAVENOUS CONTINUOUS
Status: DISCONTINUED | OUTPATIENT
Start: 2021-04-29 | End: 2021-04-29 | Stop reason: HOSPADM

## 2021-04-29 RX ORDER — LIDOCAINE HYDROCHLORIDE 20 MG/ML
INJECTION INTRAVENOUS
Status: DISCONTINUED | OUTPATIENT
Start: 2021-04-29 | End: 2021-04-29

## 2021-04-29 RX ORDER — PROPOFOL 10 MG/ML
VIAL (ML) INTRAVENOUS
Status: DISCONTINUED | OUTPATIENT
Start: 2021-04-29 | End: 2021-04-29

## 2021-04-29 RX ORDER — PROPOFOL 10 MG/ML
VIAL (ML) INTRAVENOUS CONTINUOUS PRN
Status: DISCONTINUED | OUTPATIENT
Start: 2021-04-29 | End: 2021-04-29

## 2021-04-29 RX ADMIN — GLYCOPYRROLATE 0.2 MG: 0.2 INJECTION, SOLUTION INTRAMUSCULAR; INTRAVITREAL at 09:04

## 2021-04-29 RX ADMIN — LIDOCAINE HYDROCHLORIDE 60 MG: 20 INJECTION, SOLUTION INTRAVENOUS at 09:04

## 2021-04-29 RX ADMIN — PROPOFOL 50 MG: 10 INJECTION, EMULSION INTRAVENOUS at 09:04

## 2021-04-29 RX ADMIN — PROPOFOL 150 MCG/KG/MIN: 10 INJECTION, EMULSION INTRAVENOUS at 09:04

## 2021-04-29 RX ADMIN — SODIUM CHLORIDE: 0.9 INJECTION, SOLUTION INTRAVENOUS at 09:04

## 2021-05-04 ENCOUNTER — PATIENT MESSAGE (OUTPATIENT)
Dept: RESEARCH | Facility: HOSPITAL | Age: 56
End: 2021-05-04

## 2021-05-21 ENCOUNTER — TELEPHONE (OUTPATIENT)
Dept: ENDOSCOPY | Facility: HOSPITAL | Age: 56
End: 2021-05-21

## 2021-10-26 ENCOUNTER — TELEPHONE (OUTPATIENT)
Dept: GASTROENTEROLOGY | Facility: CLINIC | Age: 56
End: 2021-10-26
Payer: COMMERCIAL

## 2022-04-28 ENCOUNTER — TELEPHONE (OUTPATIENT)
Dept: GASTROENTEROLOGY | Facility: CLINIC | Age: 57
End: 2022-04-28
Payer: COMMERCIAL

## 2022-04-28 DIAGNOSIS — R13.19 ESOPHAGEAL DYSPHAGIA: Primary | ICD-10-CM

## 2022-04-28 NOTE — TELEPHONE ENCOUNTER
"----- Message from Librado Browne MD sent at 4/28/2022  1:57 PM CDT -----  I put an order in for EGD, with high priority, she should keep on taking what she was prescribed until the scope  ----- Message -----  From: Jo Grace MA  Sent: 4/28/2022   1:16 PM CDT  To: Librado Browne MD    Spoke with patient.  Stated this past Saturday night into Sunday she had a sensation something was stuck in her throat. Had difficulty getting liquids or solids down.  Got worse over the next couple of days.  Went to ED in [place].  Was given a GI cocktail which helped and discharged on Omeprazole 40 mg,bid and Famotidine 20 mg, bid.  Feels a little better but on eating pudding and jello.  Drinking water.   She was told on discharge that she needed an EGD.   Kristel  ----- Message -----  From: Martha Browne  Sent: 4/28/2022  10:47 AM CDT  To: Pavan CALDERON Staff    "Type:  Patient Call Back    Who Called:ERNIE FUENTES [[id number]]    What is the reqeust in detail:pt requesting call back to schedule an appointment. Please advise    Can the clinic reply by [facility]?no    Best Call Back Number:[phone]      Additional Information:                "

## 2022-04-29 ENCOUNTER — PATIENT MESSAGE (OUTPATIENT)
Dept: ENDOSCOPY | Facility: HOSPITAL | Age: 57
End: 2022-04-29
Payer: COMMERCIAL

## 2022-05-03 ENCOUNTER — HOSPITAL ENCOUNTER (OUTPATIENT)
Facility: HOSPITAL | Age: 57
Discharge: HOME | End: 2022-05-03
Attending: INTERNAL MEDICINE | Admitting: INTERNAL MEDICINE
Payer: COMMERCIAL

## 2022-05-03 ENCOUNTER — ANESTHESIA (OUTPATIENT)
Dept: ENDOSCOPY | Facility: HOSPITAL | Age: 57
End: 2022-05-03
Payer: COMMERCIAL

## 2022-05-03 ENCOUNTER — ANESTHESIA EVENT (OUTPATIENT)
Dept: ENDOSCOPY | Facility: HOSPITAL | Age: 57
End: 2022-05-03
Payer: COMMERCIAL

## 2022-05-03 VITALS
DIASTOLIC BLOOD PRESSURE: 59 MMHG | TEMPERATURE: 98 F | RESPIRATION RATE: 16 BRPM | OXYGEN SATURATION: 99 % | SYSTOLIC BLOOD PRESSURE: 128 MMHG | HEART RATE: 56 BPM

## 2022-05-03 DIAGNOSIS — R13.19 ESOPHAGEAL DYSPHAGIA: ICD-10-CM

## 2022-05-03 LAB
CTP QC/QA: YES
SARS-COV-2 AG RESP QL IA.RAPID: NEGATIVE

## 2022-05-03 PROCEDURE — 43239 EGD BIOPSY SINGLE/MULTIPLE: CPT | Performed by: INTERNAL MEDICINE

## 2022-05-03 PROCEDURE — 88305 TISSUE EXAM BY PATHOLOGIST: ICD-10-PCS | Mod: 26,,, | Performed by: PATHOLOGY

## 2022-05-03 PROCEDURE — D9220A PRA ANESTHESIA: Mod: CRNA,,, | Performed by: NURSE ANESTHETIST, CERTIFIED REGISTERED

## 2022-05-03 PROCEDURE — 37000008 HC ANESTHESIA 1ST 15 MINUTES: Performed by: INTERNAL MEDICINE

## 2022-05-03 PROCEDURE — 43239 EGD BIOPSY SINGLE/MULTIPLE: CPT | Mod: ,,, | Performed by: INTERNAL MEDICINE

## 2022-05-03 PROCEDURE — 63600175 PHARM REV CODE 636 W HCPCS: Performed by: NURSE ANESTHETIST, CERTIFIED REGISTERED

## 2022-05-03 PROCEDURE — D9220A PRA ANESTHESIA: Mod: ANES,,, | Performed by: ANESTHESIOLOGY

## 2022-05-03 PROCEDURE — D9220A PRA ANESTHESIA: ICD-10-PCS | Mod: ANES,,, | Performed by: ANESTHESIOLOGY

## 2022-05-03 PROCEDURE — D9220A PRA ANESTHESIA: ICD-10-PCS | Mod: CRNA,,, | Performed by: NURSE ANESTHETIST, CERTIFIED REGISTERED

## 2022-05-03 PROCEDURE — 25000003 PHARM REV CODE 250: Performed by: INTERNAL MEDICINE

## 2022-05-03 PROCEDURE — 37000009 HC ANESTHESIA EA ADD 15 MINS: Performed by: ANESTHESIOLOGY

## 2022-05-03 PROCEDURE — 88305 TISSUE EXAM BY PATHOLOGIST: CPT | Performed by: PATHOLOGY

## 2022-05-03 PROCEDURE — 27201012 HC FORCEPS, HOT/COLD, DISP: Performed by: INTERNAL MEDICINE

## 2022-05-03 PROCEDURE — 88305 TISSUE EXAM BY PATHOLOGIST: CPT | Mod: 26,,, | Performed by: PATHOLOGY

## 2022-05-03 PROCEDURE — 37000009 HC ANESTHESIA EA ADD 15 MINS: Performed by: INTERNAL MEDICINE

## 2022-05-03 PROCEDURE — 37000008 HC ANESTHESIA 1ST 15 MINUTES: Performed by: ANESTHESIOLOGY

## 2022-05-03 PROCEDURE — 25000003 PHARM REV CODE 250: Performed by: NURSE ANESTHETIST, CERTIFIED REGISTERED

## 2022-05-03 PROCEDURE — 43239 PR EGD, FLEX, W/BIOPSY, SGL/MULTI: ICD-10-PCS | Mod: ,,, | Performed by: INTERNAL MEDICINE

## 2022-05-03 RX ORDER — PROPOFOL 10 MG/ML
INJECTION, EMULSION INTRAVENOUS
Status: DISCONTINUED
Start: 2022-05-03 | End: 2022-05-03 | Stop reason: HOSPADM

## 2022-05-03 RX ORDER — LIDOCAINE HYDROCHLORIDE 20 MG/ML
INJECTION INTRAVENOUS
Status: DISCONTINUED | OUTPATIENT
Start: 2022-05-03 | End: 2022-05-03

## 2022-05-03 RX ORDER — SUCRALFATE 1 G/10ML
1 SUSPENSION ORAL
Qty: 414 ML | Refills: 1 | Status: SHIPPED | OUTPATIENT
Start: 2022-05-03

## 2022-05-03 RX ORDER — LIDOCAINE HYDROCHLORIDE 20 MG/ML
INJECTION, SOLUTION EPIDURAL; INFILTRATION; INTRACAUDAL; PERINEURAL
Status: DISCONTINUED
Start: 2022-05-03 | End: 2022-05-03 | Stop reason: HOSPADM

## 2022-05-03 RX ORDER — FAMOTIDINE 20 MG/1
20 TABLET, FILM COATED ORAL 2 TIMES DAILY
COMMUNITY

## 2022-05-03 RX ORDER — SODIUM CHLORIDE 9 MG/ML
INJECTION, SOLUTION INTRAVENOUS CONTINUOUS
Status: DISCONTINUED | OUTPATIENT
Start: 2022-05-03 | End: 2022-05-03 | Stop reason: HOSPADM

## 2022-05-03 RX ORDER — OMEPRAZOLE 40 MG/1
40 CAPSULE, DELAYED RELEASE ORAL
COMMUNITY

## 2022-05-03 RX ORDER — PROPOFOL 10 MG/ML
VIAL (ML) INTRAVENOUS
Status: DISCONTINUED | OUTPATIENT
Start: 2022-05-03 | End: 2022-05-03

## 2022-05-03 RX ADMIN — PROPOFOL 100 MG: 10 INJECTION, EMULSION INTRAVENOUS at 03:05

## 2022-05-03 RX ADMIN — SODIUM CHLORIDE: 0.9 INJECTION, SOLUTION INTRAVENOUS at 03:05

## 2022-05-03 RX ADMIN — PROPOFOL 50 MG: 10 INJECTION, EMULSION INTRAVENOUS at 03:05

## 2022-05-03 RX ADMIN — LIDOCAINE HYDROCHLORIDE 100 MG: 20 INJECTION, SOLUTION INTRAVENOUS at 03:05

## 2022-05-03 RX ADMIN — GLYCOPYRROLATE 0.2 MG: 0.2 INJECTION, SOLUTION INTRAMUSCULAR; INTRAVITREAL at 03:05

## 2022-05-03 NOTE — PROVATION PATIENT INSTRUCTIONS
Discharge Summary/Instructions after an Endoscopic Procedure  Patient Name: Geovanna Kruse  Patient MRN: [id number]  Patient YOB: 1965  Tuesday, May 3, 2022  Librado Browne MD  Dear patient,  As a result of recent federal legislation (The Federal Cures Act), you may   receive lab or pathology results from your procedure in your [facility]   account before your physician is able to contact you. Your physician or   their representative will relay the results to you with their   recommendations at their soonest availability.  Thank you,  RESTRICTIONS:  During your procedure today, you received medications for sedation.  These   medications may affect your judgment, balance and coordination.  Therefore,   for 24 hours, you have the following restrictions:   - DO NOT drive a car, operate machinery, make legal/financial decisions,   sign important papers or drink alcohol.    ACTIVITY:  Today: no heavy lifting, straining or running due to procedural   sedation/anesthesia.  The following day: return to full activity including work.  DIET:  Eat and drink normally unless instructed otherwise.     TREATMENT FOR COMMON SIDE EFFECTS:  - Mild abdominal pain, nausea, belching, bloating or excessive gas:  rest,   eat lightly and use a heating pad.  - Sore Throat: treat with throat lozenges and/or gargle with warm salt   water.  - Because air was used during the procedure, expelling large amounts of air   from your rectum or belching is normal.  - If a bowel prep was taken, you may not have a bowel movement for 1-3 days.    This is normal.  SYMPTOMS TO WATCH FOR AND REPORT TO YOUR PHYSICIAN:  1. Abdominal pain or bloating, other than gas cramps.  2. Chest pain.  3. Back pain.  4. Signs of infection such as: chills or fever occurring within 24 hours   after the procedure.  5. Rectal bleeding, which would show as bright red, maroon, or black stools.   (A tablespoon of blood from the rectum is not serious, especially if    hemorrhoids are present.)  6. Vomiting.  7. Weakness or dizziness.  GO DIRECTLY TO THE NEAREST EMERGENCY ROOM IF YOU HAVE ANY OF THE FOLLOWING:      Difficulty breathing              Chills and/or fever over 101 F   Persistent vomiting and/or vomiting blood   Severe abdominal pain   Severe chest pain   Black, tarry stools   Bleeding- more than one tablespoon   Any other symptom or condition that you feel may need urgent attention  Your doctor recommends these additional instructions:  If any biopsies were taken, your doctors clinic will contact you in 1 to 2   weeks with any results.  - Patient has a contact number available for emergencies.  The signs and   symptoms of potential delayed complications were discussed with the   patient.  Return to normal activities tomorrow.  Written discharge   instructions were provided to the patient.   - Discharge patient to home (ambulatory).   - Resume previous diet.   - Continue present medications.   - Use sucralfate suspension 1 gram PO QID.  For questions, problems or results please call your physician - Librado Browne MD at Work:  [phone].  [facility] can be reached at [phone]     IF A COMPLICATION OR EMERGENCY SITUATION ARISES AND YOU ARE UNABLE TO REACH   YOUR PHYSICIAN - GO DIRECTLY TO THE EMERGENCY ROOM.  Librado Browne MD  5/3/2022 3:49:24 PM  This report has been verified and signed electronically.  Dear patient,  As a result of recent federal legislation (The Federal Cures Act), you may   receive lab or pathology results from your procedure in your [facility]   account before your physician is able to contact you. Your physician or   their representative will relay the results to you with their   recommendations at their soonest availability.  Thank you,  PROVATION

## 2022-05-03 NOTE — PLAN OF CARE
Patient alert, oriented, steady on her feet; has all her belongings; denies any pain nor any complaints; accompanied by her . Discharge criteria met, instructions given.

## 2022-05-03 NOTE — H&P
[facility]  Gastroenterology  H&P    Patient Name: Geovanna Kruse  MRN: [id number]  Admission Date: 5/3/2022  Code Status: Prior    Attending Provider: librado daniels  Primary Care Physician: Syd Cedillo MD  Principal Problem:<principal problem not specified>    Subjective:     History of Present Illness: chest pain and odynophagia    Past Medical History:   Diagnosis Date    Cancer     Diabetes mellitus     Migraines     Ovarian cancer        Past Surgical History:   Procedure Laterality Date    APPENDECTOMY      CHOLECYSTECTOMY      COLONOSCOPY N/A 4/29/2021    Procedure: COLONOSCOPY;  Surgeon: Librado Daniels MD;  Location: [address]);  Service: Endoscopy;  Laterality: N/A;  for evaluation of abdominal pain and screening  COVID [facility]    ENDOSCOPIC ULTRASOUND OF UPPER GASTROINTESTINAL TRACT N/A 11/28/2018    Procedure: ULTRASOUND, UPPER GI TRACT, ENDOSCOPIC;  Surgeon: Alfredo Horner MD;  Location: [facility];  Service: Endoscopy;  Laterality: N/A;  Please schedule with advanced endoscopy for EUS (to evaluate for pancreatic abnormalities; family history of pancreatic CA, recent pancreatitis 5 months ago with persistent pain), along with EGD (to confirm eradication of H.pylori)    ESOPHAGOGASTRODUODENOSCOPY N/A 11/28/2018    Procedure: EGD (ESOPHAGOGASTRODUODENOSCOPY);  Surgeon: Alfredo Horner MD;  Location: [facility];  Service: Endoscopy;  Laterality: N/A;    HYSTERECTOMY      TONSILLECTOMY         Review of patient's allergies indicates:   Allergen Reactions    Influenza virus vaccines Other (See Comments)     petechaie    Compazine [prochlorperazine edisylate] Hallucinations     Family History     Problem Relation (Age of Onset)    Cancer Brother    Liver cancer Brother    Lung cancer Brother    Pancreatic cancer Brother        Tobacco Use    Smoking status: Never Smoker    Smokeless tobacco: Never Used   Substance and Sexual Activity    Alcohol  use: No    Drug use: Not on file    Sexual activity: Not on file     Review of Systems   Respiratory: Negative.    Cardiovascular: Negative.      Objective:     Vital Signs (Most Recent):  Temp: 97.9 °F (36.6 °C) (05/03/22 1454)  Pulse: (!) 47 (05/03/22 1454)  Resp: 18 (05/03/22 1454)  BP: 136/60 (05/03/22 1454)  SpO2: 97 % (05/03/22 1454) Vital Signs (24h Range):  Temp:  [97.9 °F (36.6 °C)] 97.9 °F (36.6 °C)  Pulse:  [47] 47  Resp:  [18] 18  SpO2:  [97 %] 97 %  BP: (136)/(60) 136/60        There is no height or weight on file to calculate BMI.    No intake or output data in the 24 hours ending 05/03/22 1529    Lines/Drains/Airways     Airway  Duration                Airway - Non-Surgical 11/28/18 0913 Nasal Cannula 1252 days          Peripheral Intravenous Line  Duration                Peripheral IV - Single Lumen 05/03/22 1523 18 G Anterior;Distal;Right Upper Arm <1 day                Physical Exam  Cardiovascular:      Rate and Rhythm: Normal rate and regular rhythm.   Pulmonary:      Effort: Pulmonary effort is normal.      Breath sounds: Normal breath sounds.         Significant Labs:      Significant Imaging:      Assessment/Plan:     There are no hospital problems to display for this patient.      Indication for procedure:    ASA:II  Airway normal  Malampati class:    Personal and family history negative for anesthesia problems    Plan:egd  Anesthesia plan: general      Librado Browne MD  Gastroenterology  [facility] - Endoscopy

## 2022-05-03 NOTE — TRANSFER OF CARE
Anesthesia Transfer of Care Note    Patient: Geovanna Kruse    Procedure(s) Performed: Procedure(s) (LRB):  EGD (ESOPHAGOGASTRODUODENOSCOPY) (N/A)    Patient location: GI    Anesthesia Type: general    Transport from OR: Transported from OR on room air with adequate spontaneous ventilation    Post pain: adequate analgesia    Post assessment: no apparent anesthetic complications and tolerated procedure well    Post vital signs: stable    Level of consciousness: awake, alert and oriented    Nausea/Vomiting: no nausea/vomiting    Complications: none    Transfer of care protocol was followed      Last vitals:   Visit Vitals  BP (!) 96/49 (BP Location: Left arm, Patient Position: Lying)   Pulse (!) 50   Temp 36.5 °C (97.7 °F) (Oral)   Resp 16   SpO2 96%

## 2022-05-03 NOTE — ANESTHESIA POSTPROCEDURE EVALUATION
Anesthesia Post Evaluation    Patient: Geovanna Kruse    Procedure(s) Performed: Procedure(s) (LRB):  EGD (ESOPHAGOGASTRODUODENOSCOPY) (N/A)    Final Anesthesia Type: general      Patient location during evaluation: GI PACU  Patient participation: Yes- Able to Participate  Level of consciousness: awake and alert  Post-procedure vital signs: reviewed and stable  Pain management: adequate  Airway patency: patent    PONV status at discharge: No PONV  Anesthetic complications: no      Cardiovascular status: hemodynamically stable  Respiratory status: unassisted and spontaneous ventilation  Hydration status: euvolemic  Follow-up not needed.          Vitals Value Taken Time   /59 05/03/22 1622   Temp 36.5 °C (97.7 °F) 05/03/22 1552   Pulse 56 05/03/22 1622   Resp 16 05/03/22 1622   SpO2 99 % 05/03/22 1622         No case tracking events are documented in the log.      Pain/Lani Score: Lani Score: 10 (5/3/2022  4:22 PM)

## 2022-05-03 NOTE — ANESTHESIA PREPROCEDURE EVALUATION
05/03/2022  Geovanna Kruse is a 56 y.o., female.  [facility]  Anesthesia Pre-Operative Evaluation         Patient Name: Geovanna Kruse  YOB: 1965  MRN: [id number]    SUBJECTIVE:     Pre-operative evaluation for Procedure(s) (LRB):  EGD (ESOPHAGOGASTRODUODENOSCOPY) (N/A)     05/03/2022    Geovanna Kruse is a 56 y.o. female w/ a significant PMHx of DM, Migraines.     Patient now presents for the above procedure(s).      LDA: None documented.       Prev airway: None documented.    Drips: Non documented.      Patient Active Problem List   Diagnosis    Weight loss    Colon cancer screening       Review of patient's allergies indicates:   Allergen Reactions    Influenza virus vaccines Other (See Comments)     petechaie    Compazine [prochlorperazine edisylate] Hallucinations       Current Inpatient Medications:      No current facility-administered medications on file prior to encounter.     Current Outpatient Medications on File Prior to Encounter   Medication Sig Dispense Refill    baclofen (LIORESAL) 10 MG tablet Take 5 mg by mouth 2 (two) times daily.  5    busPIRone (BUSPAR) 5 MG Tab Take 5 mg by mouth 2 (two) times daily.  5    butorphanol (STADOL) 10 mg/mL nasal spray ADMINISTER 1 SPRAY TO 1 NOSTRIL EVERY 4 HOURS AS NEEDED FOR HEADACHE.*MUST LAST 2 WEEKS  2    chlordiazepoxide-clidinium 5-2.5 mg (LIBRAX) 5-2.5 mg Cap Take 1 capsule by mouth 3 (three) times daily.      eszopiclone (LUNESTA) 3 mg Tab Take 3 mg by mouth every evening.  5    gabapentin (NEURONTIN) 300 MG capsule Take 300 mg by mouth 2 (two) times daily.  5    GLYXAMBI 25-5 mg Tab Take 1 tablet by mouth once daily.  1    HYDROcodone-acetaminophen (NORCO)  mg per tablet Take 1 tablet by mouth 3 (three) times daily as needed.  0    hydrOXYzine pamoate (VISTARIL) 50 MG Cap  TAKE 1 TABLET BY MOUTH EVERY DAY AS NEEDED ANXIETY  2    insulin aspart U-100 (NOVOLOG) 100 unit/mL injection Inject into the skin 3 (three) times daily before meals.      insulin degludec (TRESIBA FLEXTOUCH U-100) 100 unit/mL (3 mL) InPn Inject into the skin.      rosuvastatin (CRESTOR) 20 MG tablet TAKE 1 TABLET ORALLY ONCE IN THE EVENING.  11    sodium,potassium,mag sulfates (SUPREP BOWEL PREP KIT) 17.5-3.13-1.6 gram SolR As Directed 1 Bottle 0    traZODone (DESYREL) 100 MG tablet Take 200 mg by mouth nightly.  5       Past Surgical History:   Procedure Laterality Date    APPENDECTOMY      CHOLECYSTECTOMY      COLONOSCOPY N/A 4/29/2021    Procedure: COLONOSCOPY;  Surgeon: Librado Browne MD;  Location: [facility] ([address] [place] [address]);  Service: Endoscopy;  Laterality: N/A;  for evaluation of abdominal pain and screening  COVID screening 4/26 at [facility]    ENDOSCOPIC ULTRASOUND OF UPPER GASTROINTESTINAL TRACT N/A 11/28/2018    Procedure: ULTRASOUND, UPPER GI TRACT, ENDOSCOPIC;  Surgeon: Alfredo Horner MD;  Location: [facility];  Service: Endoscopy;  Laterality: N/A;  Please schedule with advanced endoscopy for EUS (to evaluate for pancreatic abnormalities; family history of pancreatic CA, recent pancreatitis 5 months ago with persistent pain), along with EGD (to confirm eradication of H.pylori)    ESOPHAGOGASTRODUODENOSCOPY N/A 11/28/2018    Procedure: EGD (ESOPHAGOGASTRODUODENOSCOPY);  Surgeon: Alfredo Horner MD;  Location: [facility];  Service: Endoscopy;  Laterality: N/A;    HYSTERECTOMY      TONSILLECTOMY         Social History     Socioeconomic History    Marital status:    Tobacco Use    Smoking status: Never Smoker    Smokeless tobacco: Never Used   Substance and Sexual Activity    Alcohol use: No       OBJECTIVE:     Vital Signs Range (Last 24H):         Significant Labs:  Lab Results   Component Value Date    WBC 5.97 11/12/2018    HGB 14.4 11/12/2018    HCT 44.3 11/12/2018      11/12/2018    ALT 20 12/19/2018    AST 20 12/19/2018     12/19/2018    K 3.9 12/19/2018     12/19/2018    CREATININE 0.9 12/19/2018    BUN 8 12/19/2018    CO2 22 (L) 12/19/2018    TSH 2.74 04/19/2011    INR 1.0 04/19/2011    HGBA1C 6.9 (H) 04/19/2011       Diagnostic Studies: No relevant studies.    EKG:   No results found for this or any previous visit.    2D ECHO:  TTE:  No results found for this or any previous visit.    JAYLA:  No results found for this or any previous visit.    ASSESSMENT/PLAN:         Pre-op Assessment    I have reviewed the Patient Summary Reports.     I have reviewed the Nursing Notes.    I have reviewed the Medications.     Review of Systems  Anesthesia Hx:  No problems with previous Anesthesia Denies Hx of Anesthetic complications  History of prior surgery of interest to airway management or planning: Previous anesthesia: General Denies Family Hx of Anesthesia complications.   Denies Personal Hx of Anesthesia complications.   Social:  Non-Smoker, No Alcohol Use    Hematology/Oncology:  Hematology Normal       -- Cancer in past history:  Oncology Comments: ovarian     EENT/Dental:EENT/Dental Normal   Cardiovascular:   Denies Hypertension. Valvular problems/Murmurs, MR  PVD 4/19/2022 Echo: EF 70-75%; moderate MR; PAP 48 Functional Capacity good / => 4 METS    Pulmonary:   Pulmonary HTN   Renal/:  Renal/ Normal     Hepatic/GI:   Dysphagia; able to swallow water and jello; no current N/V   Musculoskeletal:  Musculoskeletal Normal    OB/GYN/PEDS:  S/p hysterectomy   Neurological:   Headaches C-spine cleared.     Endocrine:   Diabetes, type 2    Dermatological:  Skin Normal    Psych:  Psychiatric Normal           Physical Exam  General: Cooperative, Alert, Oriented and Well nourished    Airway:  Mallampati: III   Mouth Opening: Small, but > 3cm  TM Distance: 4 - 6 cm  Tongue: Normal  Neck ROM: Extension Decreased    Dental:  Intact    Chest/Lungs:  Clear to  auscultation    Heart:  Rate: Bradycardia  Rhythm: Regular Rhythm  Sounds: Normal      Wt Readings from Last 3 Encounters:   04/29/21 99.8 kg (220 lb)   03/24/21 101.4 kg (223 lb 8.7 oz)   12/19/18 104.9 kg (231 lb 4.2 oz)     Temp Readings from Last 3 Encounters:   05/03/22 36.6 °C (97.9 °F) (Oral)   04/29/21 36.7 °C (98 °F)   11/28/18 36.6 °C (97.9 °F)     BP Readings from Last 3 Encounters:   05/03/22 136/60   04/29/21 (!) 121/59   03/24/21 (!) 142/72     Pulse Readings from Last 3 Encounters:   05/03/22 (!) 47   04/29/21 (!) 47   03/24/21 60         Anesthesia Plan  Type of Anesthesia, risks & benefits discussed:    Anesthesia Type: MAC, Gen Natural Airway  Intra-op Monitoring Plan: Standard ASA Monitors  Post Op Pain Control Plan: multimodal analgesia and IV/PO Opioids PRN  Induction:  IV  Informed Consent: Informed consent signed with the Patient and all parties understand the risks and agree with anesthesia plan.  All questions answered.   ASA Score: 3  Day of Surgery Review of History & Physical: H&P Update referred to the surgeon/provider.  Anesthesia Plan Notes: Cleared by cardiology at moderate risk.    Ready For Surgery From Anesthesia Perspective.     .

## 2022-05-05 LAB
FINAL PATHOLOGIC DIAGNOSIS: NORMAL
GROSS: NORMAL
Lab: NORMAL

## 2022-05-06 ENCOUNTER — PATIENT MESSAGE (OUTPATIENT)
Dept: GASTROENTEROLOGY | Facility: HOSPITAL | Age: 57
End: 2022-05-06
Payer: COMMERCIAL

## 2022-06-13 LAB
POCT GLUCOSE: 119 MG/DL (ref 70–110)
POCT GLUCOSE: 126 MG/DL (ref 70–110)